# Patient Record
Sex: FEMALE | Race: WHITE
[De-identification: names, ages, dates, MRNs, and addresses within clinical notes are randomized per-mention and may not be internally consistent; named-entity substitution may affect disease eponyms.]

---

## 2018-04-28 ENCOUNTER — TRANSCRIPTION ENCOUNTER (OUTPATIENT)
Age: 43
End: 2018-04-28

## 2019-07-05 ENCOUNTER — TRANSCRIPTION ENCOUNTER (OUTPATIENT)
Age: 44
End: 2019-07-05

## 2023-01-21 ENCOUNTER — NON-APPOINTMENT (OUTPATIENT)
Age: 48
End: 2023-01-21

## 2023-10-06 PROBLEM — Z00.00 ENCOUNTER FOR PREVENTIVE HEALTH EXAMINATION: Status: ACTIVE | Noted: 2023-10-06

## 2023-10-13 ENCOUNTER — APPOINTMENT (OUTPATIENT)
Dept: HEART AND VASCULAR | Facility: CLINIC | Age: 48
End: 2023-10-13
Payer: COMMERCIAL

## 2023-10-13 VITALS
SYSTOLIC BLOOD PRESSURE: 125 MMHG | HEIGHT: 63 IN | HEART RATE: 90 BPM | DIASTOLIC BLOOD PRESSURE: 84 MMHG | WEIGHT: 130 LBS | BODY MASS INDEX: 23.04 KG/M2

## 2023-10-13 PROCEDURE — 93000 ELECTROCARDIOGRAM COMPLETE: CPT

## 2023-10-13 PROCEDURE — 99204 OFFICE O/P NEW MOD 45 MIN: CPT | Mod: 25

## 2023-10-15 ENCOUNTER — TRANSCRIPTION ENCOUNTER (OUTPATIENT)
Age: 48
End: 2023-10-15

## 2023-10-19 ENCOUNTER — TRANSCRIPTION ENCOUNTER (OUTPATIENT)
Age: 48
End: 2023-10-19

## 2023-11-10 ENCOUNTER — APPOINTMENT (OUTPATIENT)
Dept: HEART AND VASCULAR | Facility: CLINIC | Age: 48
End: 2023-11-10
Payer: COMMERCIAL

## 2023-11-10 VITALS
HEIGHT: 63 IN | WEIGHT: 130 LBS | TEMPERATURE: 98.1 F | DIASTOLIC BLOOD PRESSURE: 85 MMHG | SYSTOLIC BLOOD PRESSURE: 118 MMHG | BODY MASS INDEX: 23.04 KG/M2 | HEART RATE: 76 BPM | OXYGEN SATURATION: 97 %

## 2023-11-10 PROCEDURE — 93306 TTE W/DOPPLER COMPLETE: CPT

## 2023-12-14 ENCOUNTER — APPOINTMENT (OUTPATIENT)
Dept: HEART AND VASCULAR | Facility: CLINIC | Age: 48
End: 2023-12-14
Payer: COMMERCIAL

## 2023-12-14 DIAGNOSIS — R00.2 PALPITATIONS: ICD-10-CM

## 2023-12-14 PROCEDURE — 99214 OFFICE O/P EST MOD 30 MIN: CPT | Mod: 95

## 2023-12-14 NOTE — HISTORY OF PRESENT ILLNESS
[Home] : at home, [unfilled] , at the time of the visit. [Other Location: e.g. Home (Enter Location, City,State)___] : at [unfilled] [Verbal consent obtained from patient] : the patient, [unfilled] [FreeTextEntry1] : 49 yo F w/ hx of mitral prolapse, short MD, and aortic insufficiency presents today for follow up.  Since her previous visit, had echo and labs done for eval of AI and palpitations. She reports feeling better in regard to the palpitations; occurring less, not every day and she knows if she tries to keep her stress level down, avoid caffeine and stay hydrated that it helps.   Patient denies any chest pain, SOB, orthopnea, PND or LE edema.  =====================================================================================  Previous Cardiologist: Dr Emmanuel Ansari. She was prompted to go to Cardiologist 10 years ago for palpitations. Patient used to be consistently on metoprolol 10 years ago for palpitations. She last took metoprolol 5 years ago. Currently she is not on any medication. No chest pain, nausea, vomiting, PND, or orthopnea reported.   Occasional palpitations noted when climbing many flights of stairs and also with increased intake of caffeine. Of note she has gained ~20 pounds in the last 2-3 years.    Surgeries: 1979: taken tonsils and adenoids out  Hospitalizations: none  LMP: last week (10/8/2023)   Do you have polycystic ovarian syndrome? No  Have you ever been pregnant? If so, how many times? Yes, 1 Have you had any miscarriages? If so, how many? Yes, 1 Have you gone through menopause? If yes, at what age? States in early menopause (periods more frequent)   LIFESTYLE HISTORY:  Mediterranean Diet Score (9 question survey) was 3 (suboptimal).  Self-described diet: fiber bars, salad lunch, dinner protein and vegetables, too much chocolate  Exercise: Patient reports exercising at a moderate level for 150 minutes per week.  Smoking: Never  EtOH: Drink or two a week  Illicit drug use: None  Stress: Always stressed from job, head of communications at a [employer]  Sleep apnea: complains of insomnia, wake up at 2 am, snores when really tired    Family Hx: No fm hx of early heart disease  Mom: - mom is living in good health at 79  Dad: - HTN, macular degeneration  =============================== RADIOLOGY/IMAGING/DIAGNOSTIC TESTING:  -ECHO (): as per physician notes from previous office: echo shows no changes from 2017, mild AI and MR with normal EF 65%  LABS:  -lipid panel (2022): T cholesterol: 210; LDL; 114; HDL: 84; TA

## 2023-12-14 NOTE — REASON FOR VISIT
[CV Risk Factors and Non-Cardiac Disease] : CV risk factors and non-cardiac disease [Structural Heart and Valve Disease] : structural heart and valve disease [FreeTextEntry3] : Dr Sangeetha Becerra ([facility])

## 2023-12-14 NOTE — DISCUSSION/SUMMARY
[FreeTextEntry1] : 49 yo F w/ hx of mitral prolapse, short MA, and aortic insufficiency comes here to establish cardiac care.   #Palpitations: symptoms improved  #MR, AI  -ECHO (2021): as per physician notes from previous office: echo shows no changes from 2017, mild AI and MR with normal EF 65% - Repeat 11/10 also revealed normal mitral valve (no prolapse); mild AR, trace MR, TR and MA; normal LV and RV size and fx; LVEF 65%  - labs from ~2 weeks ago revealed no anemia, TSH normal, K normal and Na only slightly low at 134; not likely to be influencing palpitations and can repeat at her f/u if not done already by then  - keep well hydrated especially when having caffeine (pt rarely drinks alcohol)  - if palpitations worsen in the future, can consider stress echo and/or monitor   #HLD  - 11/2022, LDL cholesterol slightly elevated to 114; now 125 on repeat labs from 11/27  - Patient will work on improved lifestyle habits and we can repeat at her f/u in the spring  - Encouraged patient to continue healthy exercise and eating habits, focusing on a Mediterranean style of eating and aiming for the recommended 150 minutes per week of moderate physical activity.  Pt has f/u in April w/ Dr. Mcclain.

## 2024-04-12 ENCOUNTER — APPOINTMENT (OUTPATIENT)
Dept: HEART AND VASCULAR | Facility: CLINIC | Age: 49
End: 2024-04-12
Payer: COMMERCIAL

## 2024-04-12 VITALS
BODY MASS INDEX: 21.44 KG/M2 | OXYGEN SATURATION: 97 % | DIASTOLIC BLOOD PRESSURE: 70 MMHG | TEMPERATURE: 96.3 F | WEIGHT: 121 LBS | HEART RATE: 88 BPM | SYSTOLIC BLOOD PRESSURE: 103 MMHG | HEIGHT: 63 IN

## 2024-04-12 DIAGNOSIS — I35.1 NONRHEUMATIC AORTIC (VALVE) INSUFFICIENCY: ICD-10-CM

## 2024-04-12 DIAGNOSIS — E78.00 PURE HYPERCHOLESTEROLEMIA, UNSPECIFIED: ICD-10-CM

## 2024-04-12 PROCEDURE — 99214 OFFICE O/P EST MOD 30 MIN: CPT | Mod: 25

## 2024-04-12 PROCEDURE — 93000 ELECTROCARDIOGRAM COMPLETE: CPT

## 2024-04-13 NOTE — HISTORY OF PRESENT ILLNESS
[FreeTextEntry1] : 49 yo F w/ a previous hx of mitral prolapse, short PA, and aortic insufficiency presents today for follow up.  She states that she still gets rare palpitations and is often triggered by caffeine and therefore she limits her caffeine intake markedly (twice a week). Diet has improved she is taking in more vegetables and trying to cut down on candy. Walks for exercise.   Patient denies any chest pain, SOB, orthopnea, PND or LE edema. ===================================================================================== Previous Cardiologist: Dr Emmanuel Ansari. She was prompted to go to Cardiologist 10 years ago for palpitations. Patient used to be consistently on metoprolol 10 years ago for palpitations. She last took metoprolol 5 years ago. Currently she is not on any medication. No chest pain, nausea, vomiting, PND, or orthopnea reported.   Occasional palpitations noted when climbing many flights of stairs and also with increased intake of caffeine. Of note she has gained ~20 pounds in the last 2-3 years.    Surgeries: : taken tonsils and adenoids out  Hospitalizations: none  LMP: last week (10/8/2023)   Do you have polycystic ovarian syndrome? No  Have you ever been pregnant? If so, how many times? Yes, 1 Have you had any miscarriages? If so, how many? Yes, 1 Have you gone through menopause? If yes, at what age? States in early menopause (periods more frequent)   LIFESTYLE HISTORY:  Mediterranean Diet Score (9 question survey) was 3 (suboptimal).  Self-described diet: fiber bars, salad lunch, dinner protein and vegetables, too much chocolate  Exercise: Patient reports exercising at a moderate level for 150 minutes per week.  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 Left ventricular cavity is normal. Left ventricular wall thickness is normal. Left ventricular systolic function is normal with an ejection fraction of 65 % by Osei's method of disks. There are no regional wall motion abnormalities seen. 2. Normal left ventricular diastolic function, with normal filling pressure. 3. Normal right ventricular cavity size, wall thickness, and systolic function. 4. Normal atria. 5. There is mild aortic regurgitation. 6. No pericardial effusion seen.  LABS:  -A1C (2023): 5.4%  -Lipid panel (2023): , HDL 72, TAG 56,   -lipid panel (2022): T cholesterol: 210; LDL; 114; HDL: 84; TA

## 2024-04-13 NOTE — DISCUSSION/SUMMARY
[FreeTextEntry1] : 47 yo F w/ a previous hx of mitral prolapse, short DC, and aortic insufficiency presents today for follow up.  #Palpitations: symptoms improved  #Mild AI  -Echo (11/2023): 1. Left ventricular cavity is normal. Left ventricular wall thickness is normal. Left ventricular systolic function is normal with an ejection fraction of 65 % by Osei's method of disks. There are no regional wall motion abnormalities seen. 2. Normal left ventricular diastolic function, with normal filling pressure. 3. Normal right ventricular cavity size, wall thickness, and systolic function. 4. Normal atria. 5. There is mild aortic regurgitation. 6. No pericardial effusion seen. - Recent labs revealed no anemia, TSH normal, K normal and Na only slightly low at 134; not likely to be influencing palpitations  - keep well hydrated especially when having caffeine (pt very rarely drinks alcohol)  - if palpitations worsen in the future, can consider stress echo and/or monitor   #Mild HLD #ASCVD risk optimization  - 11/2022, LDL cholesterol slightly elevated to 114; now 125 on repeat labs from 11/22023 - Encouraged patient to continue healthy exercise and eating habits, focusing on a Mediterranean style of eating and aiming for the recommended 150 minutes per week of moderate physical activity. - Patient will get repeat labs with PCP   Follow up with Dr Mcclain in 1 year in person.  [EKG obtained to assist in diagnosis and management of assessed problem(s)] : EKG obtained to assist in diagnosis and management of assessed problem(s)

## 2024-09-08 ENCOUNTER — NON-APPOINTMENT (OUTPATIENT)
Age: 49
End: 2024-09-08